# Patient Record
Sex: MALE | URBAN - METROPOLITAN AREA
[De-identification: names, ages, dates, MRNs, and addresses within clinical notes are randomized per-mention and may not be internally consistent; named-entity substitution may affect disease eponyms.]

---

## 2017-05-19 ENCOUNTER — EMERGENCY (EMERGENCY)
Facility: HOSPITAL | Age: 62
LOS: 1 days | Discharge: HOME | End: 2017-05-19
Attending: EMERGENCY MEDICINE | Admitting: EMERGENCY MEDICINE
Payer: MEDICAID

## 2017-05-19 VITALS
TEMPERATURE: 98 F | HEART RATE: 80 BPM | OXYGEN SATURATION: 99 % | DIASTOLIC BLOOD PRESSURE: 86 MMHG | RESPIRATION RATE: 16 BRPM | SYSTOLIC BLOOD PRESSURE: 159 MMHG

## 2017-05-19 PROCEDURE — 99282 EMERGENCY DEPT VISIT SF MDM: CPT | Mod: 25

## 2017-05-19 NOTE — ED PROVIDER NOTE - PLAN OF CARE
- See ophthalmologist at 9AM, [address] [facility], [id number].  - Do not eat or drink anything before your appointment.    - Return to the ED for new or worsening symptoms.

## 2017-05-19 NOTE — ED ADULT NURSE NOTE - OBJECTIVE STATEMENT
Pt rec'd A&Ox3 c/o rt eye blurry vision x 2 weeks, states feels like vision is "folding in" Otherwise in NAD. MD took pt to eye room to evaluate.

## 2017-05-19 NOTE — ED PROVIDER NOTE - ATTENDING CONTRIBUTION TO CARE
Dr. Woods:  I have personally performed a face to face bedside history and physical examination of this patient. I have discussed the history, examination, review of systems, assessment and plan of management with the resident. I have reviewed the electronic medical record and amended it to reflect my history, review of systems, physical exam, assessment and plan.    62M h/o left retinal detachment presents with c/o floaters and vision loss in right eye over past several days.  Pt states he's been experiencing several weeks of cloudy vision and floaters in right eye, but noticed partial vision loss over past several days.  Denies pain.    Exam:  - nad  - visual acuity intact, perrl, eomi    A/P  - concern for partial retinal detachment  - US eye  - ophthalmology consult

## 2017-05-19 NOTE — ED ADULT TRIAGE NOTE - CHIEF COMPLAINT QUOTE
pt c/o right eye vision loss. pt states he has partial vision in the eye. pt states he had eye surgery 7 months ago on left eye for detached retina. pt similar symptoms with left eye. pt with no drainage or redness to eye.

## 2017-05-19 NOTE — ED PROVIDER NOTE - CARE PLAN
Principal Discharge DX:	Retinal detachment  Instructions for follow-up, activity and diet:	- See ophthalmologist at 9AM, [address] [facility], [id number].  - Do not eat or drink anything before your appointment.    - Return to the ED for new or worsening symptoms.

## 2017-05-19 NOTE — ED PROVIDER NOTE - OBJECTIVE STATEMENT
62M h/o cataracts and L-sided retinal detachment s/p repair p/w R-sided vision issues. States that on 5/1 began having R-sided cloudy vision with floaters. Saw one of his ophthalmologists on 5/10. For the last 3 days felt like the inferior/medial portion of his vision "folded in". Has an appt at his retinal repair ophthalmologists next week. Denies pain, double vision.

## 2017-05-19 NOTE — ED PROVIDER NOTE - MEDICAL DECISION MAKING DETAILS
Feels similar to previous retinal detachment, optho called and will see in office later this morning.

## 2017-05-22 ENCOUNTER — OUTPATIENT (OUTPATIENT)
Dept: OUTPATIENT SERVICES | Facility: HOSPITAL | Age: 62
LOS: 1 days | Discharge: HOME | End: 2017-05-22

## 2017-05-25 DIAGNOSIS — H33.021 RETINAL DETACHMENT WITH MULTIPLE BREAKS, RIGHT EYE: ICD-10-CM

## 2017-05-25 DIAGNOSIS — Z72.0 TOBACCO USE: ICD-10-CM
